# Patient Record
Sex: FEMALE | Race: BLACK OR AFRICAN AMERICAN | NOT HISPANIC OR LATINO | Employment: FULL TIME | URBAN - METROPOLITAN AREA
[De-identification: names, ages, dates, MRNs, and addresses within clinical notes are randomized per-mention and may not be internally consistent; named-entity substitution may affect disease eponyms.]

---

## 2021-03-27 ENCOUNTER — HOSPITAL ENCOUNTER (EMERGENCY)
Facility: CLINIC | Age: 21
Discharge: HOME | End: 2021-03-28
Attending: EMERGENCY MEDICINE | Admitting: EMERGENCY MEDICINE
Payer: COMMERCIAL

## 2021-03-27 DIAGNOSIS — R00.2 PALPITATIONS: ICD-10-CM

## 2021-03-27 DIAGNOSIS — Z86.59 HISTORY OF ANXIETY: ICD-10-CM

## 2021-03-27 LAB
ANION GAP SERPL CALCULATED.3IONS-SCNC: 4 MMOL/L (ref 3–14)
BASOPHILS # BLD AUTO: 0 10E9/L (ref 0–0.2)
BASOPHILS NFR BLD AUTO: 0.4 %
BUN SERPL-MCNC: 10 MG/DL (ref 7–30)
CALCIUM SERPL-MCNC: 8.9 MG/DL (ref 8.5–10.1)
CHLORIDE SERPL-SCNC: 106 MMOL/L (ref 94–109)
CO2 SERPL-SCNC: 26 MMOL/L (ref 20–32)
CREAT SERPL-MCNC: 0.73 MG/DL (ref 0.52–1.04)
DIFFERENTIAL METHOD BLD: NORMAL
EOSINOPHIL # BLD AUTO: 0.1 10E9/L (ref 0–0.7)
EOSINOPHIL NFR BLD AUTO: 1 %
ERYTHROCYTE [DISTWIDTH] IN BLOOD BY AUTOMATED COUNT: 11.8 % (ref 10–15)
GFR SERPL CREATININE-BSD FRML MDRD: >90 ML/MIN/{1.73_M2}
GLUCOSE SERPL-MCNC: 143 MG/DL (ref 70–99)
HCT VFR BLD AUTO: 38.9 % (ref 35–47)
HGB BLD-MCNC: 13.1 G/DL (ref 11.7–15.7)
IMM GRANULOCYTES # BLD: 0 10E9/L (ref 0–0.4)
IMM GRANULOCYTES NFR BLD: 0.3 %
LYMPHOCYTES # BLD AUTO: 2.1 10E9/L (ref 0.8–5.3)
LYMPHOCYTES NFR BLD AUTO: 19.5 %
MCH RBC QN AUTO: 29.2 PG (ref 26.5–33)
MCHC RBC AUTO-ENTMCNC: 33.7 G/DL (ref 31.5–36.5)
MCV RBC AUTO: 87 FL (ref 78–100)
MONOCYTES # BLD AUTO: 0.6 10E9/L (ref 0–1.3)
MONOCYTES NFR BLD AUTO: 5.7 %
NEUTROPHILS # BLD AUTO: 7.8 10E9/L (ref 1.6–8.3)
NEUTROPHILS NFR BLD AUTO: 73.1 %
NRBC # BLD AUTO: 0 10*3/UL
NRBC BLD AUTO-RTO: 0 /100
PLATELET # BLD AUTO: 383 10E9/L (ref 150–450)
POTASSIUM SERPL-SCNC: 3.3 MMOL/L (ref 3.4–5.3)
RBC # BLD AUTO: 4.49 10E12/L (ref 3.8–5.2)
SODIUM SERPL-SCNC: 136 MMOL/L (ref 133–144)
WBC # BLD AUTO: 10.7 10E9/L (ref 4–11)

## 2021-03-27 PROCEDURE — 85025 COMPLETE CBC W/AUTO DIFF WBC: CPT | Performed by: EMERGENCY MEDICINE

## 2021-03-27 PROCEDURE — 93005 ELECTROCARDIOGRAM TRACING: CPT

## 2021-03-27 PROCEDURE — 85379 FIBRIN DEGRADATION QUANT: CPT | Performed by: EMERGENCY MEDICINE

## 2021-03-27 PROCEDURE — 99284 EMERGENCY DEPT VISIT MOD MDM: CPT | Mod: 25

## 2021-03-27 PROCEDURE — 96360 HYDRATION IV INFUSION INIT: CPT

## 2021-03-27 PROCEDURE — 96361 HYDRATE IV INFUSION ADD-ON: CPT

## 2021-03-27 PROCEDURE — 80048 BASIC METABOLIC PNL TOTAL CA: CPT | Performed by: EMERGENCY MEDICINE

## 2021-03-27 PROCEDURE — 258N000003 HC RX IP 258 OP 636: Performed by: EMERGENCY MEDICINE

## 2021-03-27 PROCEDURE — 84703 CHORIONIC GONADOTROPIN ASSAY: CPT | Performed by: EMERGENCY MEDICINE

## 2021-03-27 RX ORDER — SODIUM CHLORIDE 9 MG/ML
INJECTION, SOLUTION INTRAVENOUS CONTINUOUS
Status: DISCONTINUED | OUTPATIENT
Start: 2021-03-28 | End: 2021-03-28 | Stop reason: HOSPADM

## 2021-03-27 RX ADMIN — SODIUM CHLORIDE 1000 ML: 9 INJECTION, SOLUTION INTRAVENOUS at 23:38

## 2021-03-27 ASSESSMENT — ENCOUNTER SYMPTOMS
COUGH: 0
CHEST TIGHTNESS: 1
LIGHT-HEADEDNESS: 1
PALPITATIONS: 1
CHILLS: 0
NAUSEA: 0
VOMITING: 0
FEVER: 0

## 2021-03-27 ASSESSMENT — MIFFLIN-ST. JEOR: SCORE: 1219.94

## 2021-03-28 VITALS
BODY MASS INDEX: 18.78 KG/M2 | HEIGHT: 63 IN | SYSTOLIC BLOOD PRESSURE: 117 MMHG | RESPIRATION RATE: 18 BRPM | HEART RATE: 107 BPM | WEIGHT: 106 LBS | TEMPERATURE: 98.5 F | DIASTOLIC BLOOD PRESSURE: 68 MMHG | OXYGEN SATURATION: 98 %

## 2021-03-28 LAB
D DIMER PPP FEU-MCNC: <0.3 UG/ML FEU (ref 0–0.5)
HCG SERPL QL: NEGATIVE
INTERPRETATION ECG - MUSE: NORMAL

## 2021-03-28 PROCEDURE — 258N000003 HC RX IP 258 OP 636: Performed by: EMERGENCY MEDICINE

## 2021-03-28 RX ADMIN — SODIUM CHLORIDE: 9 INJECTION, SOLUTION INTRAVENOUS at 00:57

## 2021-03-28 NOTE — ED PROVIDER NOTES
History   Chief Complaint:  Palpitations      HPI   Drea Schneider is a 20 year old female with history of anxiety who presents with palpitations.  The patient reports occasional episodes of her heart racing followed by lightheadedness, shakiness, and chest pain.  These episodes last 5 - 10 episodes and occur about twice a week.  She has not been able to identify any precipitating factors and feels these are not tied to more stressful situations.  She wears an Apple watch and her heart rate jumps up during these episodes to about the 150s and then gradually slows to normal range.  She is using Buspar prn for anxiety and took it today during an episode which seemed to help but the symptoms then worsened about 30 minutes later.  She was on Paxil but stopped this about a month ago as she did not like how it made her feel.  Currently she feels better other than some persistent shakiness.    Review of Systems   Constitutional: Negative for chills and fever.   Respiratory: Positive for chest tightness. Negative for cough.    Cardiovascular: Positive for chest pain and palpitations.   Gastrointestinal: Negative for nausea and vomiting.   Neurological: Positive for light-headedness.   All other systems reviewed and are negative.    Allergies:  No known drug allergies.     Medications:  Loratadine   Buspirone   Paxil  Renata  Asmanex inhaler  Albuterol inhaler  Flovent inhaler  Flonase nasal spray  Polyethylene glycol     Past Medical History:    Intermittent asthma  Allergic rhinitis       Social History:  Presents to the ED with a friend  PCP: Christine Monson NP     Physical Exam     Patient Vitals for the past 24 hrs:   BP Temp Temp src Pulse Resp SpO2 Height Weight   03/28/21 0100 -- -- -- 107 18 98 % -- --   03/28/21 0045 -- -- -- 111 13 99 % -- --   03/28/21 0030 -- -- -- 90 10 96 % -- --   03/28/21 0015 -- -- -- 98 17 98 % -- --   03/28/21 0000 117/68 -- -- 106 -- 97 % -- --   03/27/21 2330 -- -- -- 96 11 96 % -- --  "  03/27/21 2315 134/83 -- -- 110 21 100 % -- --   03/27/21 2255 -- -- -- -- -- -- 1.6 m (5' 3\") 48.1 kg (106 lb)   03/27/21 2254 (!) 150/84 98.5  F (36.9  C) Oral 127 20 100 % -- 48.1 kg (106 lb)       Physical Exam  Eye:  Pupils are equal, round, and reactive.  Extraocular movements intact.    ENT:  No rhinorrhea.  Moist mucus membranes.  Normal tongue and tonsil.    Cardiac:  Mildly tachycardic but regular.  No murmurs, gallops, or rubs.    Pulmonary:  Clear to auscultation bilaterally.  No wheezes, rales, or rhonchi.    Abdomen:  Positive bowel sounds.  Abdomen is soft and non-distended, without focal tenderness.    Musculoskeletal:  Normal movement of all extremities without evidence for deficit.    Skin:  Warm and dry without rashes.    Neurologic:  Non-focal exam without asymmetric weakness or numbness.     Psychiatric:  Normal affect with appropriate interaction with examiner.     Emergency Department Course   ECG:  ECG taken at 2310, ECG read at 2316  Sinus tachycardia. Septal infarct, age undetermined. T-wave abnormality, consider inferior ischemia. Abnormal ECG.   Rate 105 bpm. OH interval 202 ms. QRS duration 82 ms. QT/QTc 310/409 ms. P-R-T axes 67 84 17.     Laboratory:   CBC: WBC 10.7, HGB 13.1,   BMP: K 3.3 (L), Glucose 143 (H), otherwise WNL (Creatinine 0.73)    D-dimer: <0.3   HCG Qualitative, Blood: negative     Emergency Department Course:  Reviewed:  I reviewed nursing notes, vitals, past medical history and Care Everywhere    Assessments:  (2325) I obtained history and examined the patient as noted above.   (0100) I rechecked the patient and explained findings.     Interventions:  (2338) Normal Saline, 1 liter, IV bolus   (0057) Normal Saline, 125 mL/hr, IV infusion      Disposition:  The patient was discharged to home.     Impression & Plan     Medical Decision Making:  This delightful 20-year-old with a history of anxiety presents to us with spells of palpitations where she feels that " "her heart is suddenly racing and she feels short of breath.  She states that these spells come \"out of the blue\" and that she has been under less stress than usual recently.  She is concerned that these represent something more than panic attacks.    At time of my assessment, she was mildly tachycardic.  Head to toe exam is otherwise reassuring.  EKG shows a sinus tachycardia without other significant abnormality.  Laboratory investigation is normal.  D-dimer is negative.  Hemoglobin is normal.  She is not pregnant.    At this point, we discussed the possibility that this could represent spells of SVT.  We will set her up with a 48-hour Holter monitor.  Otherwise, she was advised to continue to work with her doctor on her anxiety and panic disorder as this seems more likely.  I have no concerns for acute coronary syndrome, pulmonary embolism, or other more serious intrathoracic pathologies.  She will otherwise return to us if she has prolonged spells of this or if there are any other emergent concerns.    Diagnosis:    ICD-10-CM    1. Palpitations  R00.2 Holter Monitor 48 hour Adult Pediatric   2. History of anxiety  Z86.59        Scribe Disclosure:  I, Roslyn Fish, am serving as a scribe at 11:17 PM on 3/27/2021 to document services personally performed by Trierweiler, Chad A, MD based on my observations and the provider's statements to me.         Trierweiler, Chad A, MD  03/28/21 0650    "

## 2021-03-28 NOTE — ED TRIAGE NOTES
Pt has had episodes of palpitations with shakiness on and off for the past couple of weeks. She states she thought it was anxiety, and took Buspirone about half hour ago. Pt states she feels tightness in her chest.

## 2021-04-05 ENCOUNTER — HOSPITAL ENCOUNTER (OUTPATIENT)
Dept: CARDIOLOGY | Facility: CLINIC | Age: 21
Discharge: HOME | End: 2021-04-05
Attending: EMERGENCY MEDICINE | Admitting: EMERGENCY MEDICINE
Payer: COMMERCIAL

## 2021-04-05 DIAGNOSIS — R00.2 PALPITATIONS: ICD-10-CM

## 2021-04-05 PROCEDURE — 93227 XTRNL ECG REC<48 HR R&I: CPT | Performed by: INTERNAL MEDICINE

## 2021-04-05 PROCEDURE — 93225 XTRNL ECG REC<48 HRS REC: CPT

## 2021-05-11 ENCOUNTER — TELEPHONE (OUTPATIENT)
Dept: CARDIOLOGY | Facility: CLINIC | Age: 21
End: 2021-05-11

## 2021-05-11 NOTE — TELEPHONE ENCOUNTER
I received a call from the [facility] asking for help obtaining Holter Monitor report for their visit today. Report was printed and faxed to Christine Monson (provider) at [phone].

## 2021-11-20 ENCOUNTER — HEALTH MAINTENANCE LETTER (OUTPATIENT)
Age: 21
End: 2021-11-20

## 2022-04-21 ENCOUNTER — OFFICE VISIT (OUTPATIENT)
Dept: FAMILY MEDICINE | Facility: CLINIC | Age: 22
End: 2022-04-21

## 2022-04-21 VITALS
SYSTOLIC BLOOD PRESSURE: 120 MMHG | TEMPERATURE: 99.5 F | DIASTOLIC BLOOD PRESSURE: 70 MMHG | OXYGEN SATURATION: 97 % | HEART RATE: 88 BPM

## 2022-04-21 DIAGNOSIS — J32.9 SINUSITIS, UNSPECIFIED CHRONICITY, UNSPECIFIED LOCATION: Primary | ICD-10-CM

## 2022-04-21 PROCEDURE — 99203 OFFICE O/P NEW LOW 30 MIN: CPT | Performed by: FAMILY MEDICINE

## 2022-04-21 RX ORDER — AZITHROMYCIN 250 MG/1
TABLET, FILM COATED ORAL
Qty: 6 TABLET | Refills: 0 | Status: SHIPPED | OUTPATIENT
Start: 2022-04-21

## 2022-04-21 ASSESSMENT — ASTHMA QUESTIONNAIRES
ACT_TOTALSCORE: 24
QUESTION_2 LAST FOUR WEEKS HOW OFTEN HAVE YOU HAD SHORTNESS OF BREATH: NOT AT ALL
QUESTION_1 LAST FOUR WEEKS HOW MUCH OF THE TIME DID YOUR ASTHMA KEEP YOU FROM GETTING AS MUCH DONE AT WORK, SCHOOL OR AT HOME: NONE OF THE TIME
ACT_TOTALSCORE: 24
QUESTION_3 LAST FOUR WEEKS HOW OFTEN DID YOUR ASTHMA SYMPTOMS (WHEEZING, COUGHING, SHORTNESS OF BREATH, CHEST TIGHTNESS OR PAIN) WAKE YOU UP AT NIGHT OR EARLIER THAN USUAL IN THE MORNING: NOT AT ALL
QUESTION_4 LAST FOUR WEEKS HOW OFTEN HAVE YOU USED YOUR RESCUE INHALER OR NEBULIZER MEDICATION (SUCH AS ALBUTEROL): NOT AT ALL
QUESTION_5 LAST FOUR WEEKS HOW WOULD YOU RATE YOUR ASTHMA CONTROL: WELL CONTROLLED

## 2022-04-21 NOTE — PROGRESS NOTES
Assessment & Plan     Sinusitis, unspecified chronicity, unspecified location  Patient continued to have some upper respiratory symptoms sinus congestion.  Suggested azithromycin.  If symptoms does not improve or any fever I would suggest repeating COVID test.  Symptomatic care discussed with the patient  - azithromycin (ZITHROMAX) 250 MG tablet; Two tablets first day, then one tablet daily for four days.          Return in about 2 weeks (around 5/5/2022) for if symptom does not get better.    Mateo Sherwood MD  [facility] CRYSTAL Shepard is a 21 year old who presents for the following health issues     History of Present Illness       Reason for visit:  Sore throat and congestion - neg at home covid test Monday   Symptom onset:  3-7 days ago    She eats 2-3 servings of fruits and vegetables daily.She consumes 0 sweetened beverage(s) daily.She exercises with enough effort to increase her heart rate 10 to 19 minutes per day.  She exercises with enough effort to increase her heart rate 3 or less days per week.   She is taking medications regularly.     Now complains more of sinus congestion throat is improving.  Home COVID test is negative she is vaccinated.  She initially had fever which has improved.    Review of Systems   Constitutional, HEENT, cardiovascular, pulmonary, gi and gu systems are negative, except as otherwise noted.      Objective    /70   Pulse 88   Temp 99.5  F (37.5  C) (Tympanic)   LMP 04/19/2022   SpO2 97%   There is no height or weight on file to calculate BMI.  Physical Exam   GENERAL: healthy, alert and no distress  NECK: no adenopathy, no asymmetry, masses, or scars and thyroid normal to palpation  RESP: lungs clear to auscultation - no rales, rhonchi or wheezes  Sinus tenderness and nasal congestin noted.  CV: regular rate and rhythm, normal S1 S2, no S3 or S4, no murmur, click or rub, no peripheral edema and peripheral pulses strong

## 2022-09-24 ENCOUNTER — APPOINTMENT (OUTPATIENT)
Dept: ULTRASOUND IMAGING | Facility: CLINIC | Age: 22
End: 2022-09-24
Attending: EMERGENCY MEDICINE

## 2022-09-24 ENCOUNTER — HOSPITAL ENCOUNTER (EMERGENCY)
Facility: CLINIC | Age: 22
Discharge: HOME | End: 2022-09-24
Attending: EMERGENCY MEDICINE | Admitting: EMERGENCY MEDICINE

## 2022-09-24 VITALS
HEIGHT: 64 IN | BODY MASS INDEX: 19.63 KG/M2 | OXYGEN SATURATION: 96 % | RESPIRATION RATE: 20 BRPM | HEART RATE: 84 BPM | DIASTOLIC BLOOD PRESSURE: 82 MMHG | TEMPERATURE: 97.8 F | WEIGHT: 115 LBS | SYSTOLIC BLOOD PRESSURE: 124 MMHG

## 2022-09-24 DIAGNOSIS — R10.13 ABDOMINAL PAIN, EPIGASTRIC: ICD-10-CM

## 2022-09-24 DIAGNOSIS — K29.00 ACUTE GASTRITIS, PRESENCE OF BLEEDING UNSPECIFIED, UNSPECIFIED GASTRITIS TYPE: ICD-10-CM

## 2022-09-24 DIAGNOSIS — R11.2 NON-INTRACTABLE VOMITING WITH NAUSEA, UNSPECIFIED VOMITING TYPE: ICD-10-CM

## 2022-09-24 LAB
ALBUMIN SERPL-MCNC: 4.3 G/DL (ref 3.4–5)
ALP SERPL-CCNC: 54 U/L (ref 40–150)
ALT SERPL W P-5'-P-CCNC: 26 U/L (ref 0–50)
ANION GAP SERPL CALCULATED.3IONS-SCNC: 9 MMOL/L (ref 3–14)
AST SERPL W P-5'-P-CCNC: 22 U/L (ref 0–45)
BASOPHILS # BLD AUTO: 0.1 10E3/UL (ref 0–0.2)
BASOPHILS NFR BLD AUTO: 0 %
BILIRUB SERPL-MCNC: 0.5 MG/DL (ref 0.2–1.3)
BUN SERPL-MCNC: 13 MG/DL (ref 7–30)
CALCIUM SERPL-MCNC: 8.9 MG/DL (ref 8.5–10.1)
CHLORIDE BLD-SCNC: 109 MMOL/L (ref 94–109)
CO2 SERPL-SCNC: 24 MMOL/L (ref 20–32)
CREAT SERPL-MCNC: 0.61 MG/DL (ref 0.52–1.04)
EOSINOPHIL # BLD AUTO: 0.1 10E3/UL (ref 0–0.7)
EOSINOPHIL NFR BLD AUTO: 1 %
ERYTHROCYTE [DISTWIDTH] IN BLOOD BY AUTOMATED COUNT: 12.1 % (ref 10–15)
GFR SERPL CREATININE-BSD FRML MDRD: >90 ML/MIN/1.73M2
GLUCOSE BLD-MCNC: 103 MG/DL (ref 70–99)
HCT VFR BLD AUTO: 39.5 % (ref 35–47)
HGB BLD-MCNC: 13.7 G/DL (ref 11.7–15.7)
IMM GRANULOCYTES # BLD: 0.1 10E3/UL
IMM GRANULOCYTES NFR BLD: 1 %
LIPASE SERPL-CCNC: 77 U/L (ref 73–393)
LYMPHOCYTES # BLD AUTO: 1.4 10E3/UL (ref 0.8–5.3)
LYMPHOCYTES NFR BLD AUTO: 9 %
MCH RBC QN AUTO: 30.2 PG (ref 26.5–33)
MCHC RBC AUTO-ENTMCNC: 34.7 G/DL (ref 31.5–36.5)
MCV RBC AUTO: 87 FL (ref 78–100)
MONOCYTES # BLD AUTO: 0.7 10E3/UL (ref 0–1.3)
MONOCYTES NFR BLD AUTO: 5 %
NEUTROPHILS # BLD AUTO: 13.5 10E3/UL (ref 1.6–8.3)
NEUTROPHILS NFR BLD AUTO: 84 %
NRBC # BLD AUTO: 0 10E3/UL
NRBC BLD AUTO-RTO: 0 /100
PLATELET # BLD AUTO: 354 10E3/UL (ref 150–450)
POTASSIUM BLD-SCNC: 3.6 MMOL/L (ref 3.4–5.3)
PROT SERPL-MCNC: 8 G/DL (ref 6.8–8.8)
RBC # BLD AUTO: 4.54 10E6/UL (ref 3.8–5.2)
SODIUM SERPL-SCNC: 142 MMOL/L (ref 133–144)
WBC # BLD AUTO: 15.9 10E3/UL (ref 4–11)

## 2022-09-24 PROCEDURE — 99285 EMERGENCY DEPT VISIT HI MDM: CPT | Mod: 25

## 2022-09-24 PROCEDURE — 96361 HYDRATE IV INFUSION ADD-ON: CPT

## 2022-09-24 PROCEDURE — 80053 COMPREHEN METABOLIC PANEL: CPT | Performed by: EMERGENCY MEDICINE

## 2022-09-24 PROCEDURE — 258N000003 HC RX IP 258 OP 636: Performed by: EMERGENCY MEDICINE

## 2022-09-24 PROCEDURE — 96375 TX/PRO/DX INJ NEW DRUG ADDON: CPT

## 2022-09-24 PROCEDURE — 36415 COLL VENOUS BLD VENIPUNCTURE: CPT | Performed by: EMERGENCY MEDICINE

## 2022-09-24 PROCEDURE — 96374 THER/PROPH/DIAG INJ IV PUSH: CPT

## 2022-09-24 PROCEDURE — 85014 HEMATOCRIT: CPT | Performed by: EMERGENCY MEDICINE

## 2022-09-24 PROCEDURE — 250N000011 HC RX IP 250 OP 636: Performed by: EMERGENCY MEDICINE

## 2022-09-24 PROCEDURE — 76705 ECHO EXAM OF ABDOMEN: CPT

## 2022-09-24 PROCEDURE — 250N000013 HC RX MED GY IP 250 OP 250 PS 637: Performed by: EMERGENCY MEDICINE

## 2022-09-24 PROCEDURE — 83690 ASSAY OF LIPASE: CPT | Performed by: EMERGENCY MEDICINE

## 2022-09-24 PROCEDURE — 82040 ASSAY OF SERUM ALBUMIN: CPT | Performed by: EMERGENCY MEDICINE

## 2022-09-24 RX ORDER — HYDROCODONE BITARTRATE AND ACETAMINOPHEN 5; 325 MG/1; MG/1
2 TABLET ORAL ONCE
Status: COMPLETED | OUTPATIENT
Start: 2022-09-24 | End: 2022-09-24

## 2022-09-24 RX ORDER — HYDROCODONE BITARTRATE AND ACETAMINOPHEN 5; 325 MG/1; MG/1
1 TABLET ORAL EVERY 6 HOURS PRN
Qty: 8 TABLET | Refills: 0 | Status: SHIPPED | OUTPATIENT
Start: 2022-09-24 | End: 2022-09-27

## 2022-09-24 RX ORDER — ONDANSETRON 4 MG/1
4 TABLET, ORALLY DISINTEGRATING ORAL EVERY 8 HOURS PRN
Qty: 10 TABLET | Refills: 0 | Status: SHIPPED | OUTPATIENT
Start: 2022-09-24

## 2022-09-24 RX ORDER — HYDROMORPHONE HYDROCHLORIDE 1 MG/ML
0.5 INJECTION, SOLUTION INTRAMUSCULAR; INTRAVENOUS; SUBCUTANEOUS
Status: DISCONTINUED | OUTPATIENT
Start: 2022-09-24 | End: 2022-09-24

## 2022-09-24 RX ORDER — KETOROLAC TROMETHAMINE 15 MG/ML
15 INJECTION, SOLUTION INTRAMUSCULAR; INTRAVENOUS ONCE
Status: COMPLETED | OUTPATIENT
Start: 2022-09-24 | End: 2022-09-24

## 2022-09-24 RX ORDER — ONDANSETRON 2 MG/ML
4 INJECTION INTRAMUSCULAR; INTRAVENOUS ONCE
Status: COMPLETED | OUTPATIENT
Start: 2022-09-24 | End: 2022-09-24

## 2022-09-24 RX ORDER — SUCRALFATE 1 G/1
1 TABLET ORAL 4 TIMES DAILY PRN
Qty: 60 TABLET | Refills: 0 | Status: SHIPPED | OUTPATIENT
Start: 2022-09-24

## 2022-09-24 RX ADMIN — HYDROCODONE BITARTRATE AND ACETAMINOPHEN 2 TABLET: 5; 325 TABLET ORAL at 12:59

## 2022-09-24 RX ADMIN — KETOROLAC TROMETHAMINE 15 MG: 15 INJECTION, SOLUTION INTRAMUSCULAR; INTRAVENOUS at 10:48

## 2022-09-24 RX ADMIN — SODIUM CHLORIDE 1000 ML: 9 INJECTION, SOLUTION INTRAVENOUS at 10:46

## 2022-09-24 RX ADMIN — FAMOTIDINE 20 MG: 10 INJECTION, SOLUTION INTRAVENOUS at 12:03

## 2022-09-24 RX ADMIN — ONDANSETRON 4 MG: 2 INJECTION INTRAMUSCULAR; INTRAVENOUS at 10:47

## 2022-09-24 ASSESSMENT — ENCOUNTER SYMPTOMS
VOMITING: 1
ABDOMINAL PAIN: 1
FEVER: 0
NAUSEA: 1
COUGH: 0
SHORTNESS OF BREATH: 0

## 2022-09-24 ASSESSMENT — ACTIVITIES OF DAILY LIVING (ADL)
ADLS_ACUITY_SCORE: 35
ADLS_ACUITY_SCORE: 35

## 2022-09-24 NOTE — ED NOTES
Dilaudid offered to patient. Patient stated her pain level was 1/10 and declined need for Dilaudid at this time. Writer informed patient that a dose was still available if her pain level increased. Patient acknowledged understanding.

## 2022-09-24 NOTE — ED PROVIDER NOTES
"  History   Chief Complaint:  Abdominal Pain       The history is provided by the patient.      Drea Schneider is a 22 year old female who presents with abdominal pain. The patient states that she went out drinking last night, and started experiencing severe abdominal pain this morning. The pain is centralized and is accompanied with nausea and vomiting. The patient denies any pelvic pain, fever, cough, shortness of breath, chest pain, history of pancreatitis, or previous abdominal surgeries. Of note, her last menstruation was a few weeks ago.     Review of Systems   Constitutional: Negative for fever.   Respiratory: Negative for cough and shortness of breath.    Cardiovascular: Negative for chest pain.   Gastrointestinal: Positive for abdominal pain, nausea and vomiting.   Genitourinary: Negative for pelvic pain.   All other systems reviewed and are negative.    Allergies:  The patient has no known allergies.     Medications:  Buspar  Albuterol    Past Medical History:     SERGIO  Panic attacks  Asthma  Heart murmur     Family History:    Heart disease - father  Anxiety - mother    Social History:  Patient came from home.  Patient is accompanied in the ED by her partner.    Physical Exam     Patient Vitals for the past 24 hrs:   BP Temp Temp src Pulse Resp SpO2 Height Weight   09/24/22 1345 -- -- -- 84 -- 96 % -- --   09/24/22 1230 -- -- -- 79 -- 99 % -- --   09/24/22 1130 -- -- -- 89 -- 97 % -- --   09/24/22 1115 -- -- -- -- -- 98 % -- --   09/24/22 1050 -- -- -- -- 20 -- -- --   09/24/22 1025 124/82 97.8  F (36.6  C) Temporal 104 -- 99 % 1.626 m (5' 4\") 52.2 kg (115 lb)     Physical Exam  General: Alert, appears well-developed and well-nourished. Cooperative.     In mild distress  HEENT:  Head:  Atraumatic  Ears:  External ears are normal  Mouth/Throat:  Oropharynx is without erythema or exudate and mucous membranes are moist.   Eyes:   Conjunctivae normal and EOM are normal. No scleral icterus.  CV:  Tachycardic " rate, regular rhythm, normal heart sounds and radial pulses are 2+ and symmetric.  No murmur.  Resp:  Breath sounds are clear bilaterally    Non-labored, no retractions or accessory muscle use  GI:  Abdomen is soft, no distension, epigastric tenderness. No rebound or guarding.  No CVA tenderness bilaterally  MS:  Normal range of motion. No edema.    Normal strength in all 4 extremities.     Back atraumatic.    No midline cervical, thoracic, or lumbar tenderness  Skin:  Warm and dry.  No rash or lesions noted.  Neuro: Alert. Normal strength.  GCS: 15  Psych:  Normal mood and affect.    Emergency Department Course   ECG  ECG results from 03/27/21   EKG 12 lead     Value    Interpretation ECG Click View Image link to view waveform and result     Imaging:  US Abdomen Limited (RUQ)   Final Result   IMPRESSION:   Unremarkable right upper quadrant ultrasound. No cause for epigastric pain is identified.              Report per radiology    Laboratory:  Labs Ordered and Resulted from Time of ED Arrival to Time of ED Departure   COMPREHENSIVE METABOLIC PANEL - Abnormal       Result Value    Sodium 142      Potassium 3.6      Chloride 109      Carbon Dioxide (CO2) 24      Anion Gap 9      Urea Nitrogen 13      Creatinine 0.61      Calcium 8.9      Glucose 103 (*)     Alkaline Phosphatase 54      AST 22      ALT 26      Protein Total 8.0      Albumin 4.3      Bilirubin Total 0.5      GFR Estimate >90     CBC WITH PLATELETS AND DIFFERENTIAL - Abnormal    WBC Count 15.9 (*)     RBC Count 4.54      Hemoglobin 13.7      Hematocrit 39.5      MCV 87      MCH 30.2      MCHC 34.7      RDW 12.1      Platelet Count 354      % Neutrophils 84      % Lymphocytes 9      % Monocytes 5      % Eosinophils 1      % Basophils 0      % Immature Granulocytes 1      NRBCs per 100 WBC 0      Absolute Neutrophils 13.5 (*)     Absolute Lymphocytes 1.4      Absolute Monocytes 0.7      Absolute Eosinophils 0.1      Absolute Basophils 0.1      Absolute  Immature Granulocytes 0.1      Absolute NRBCs 0.0     LIPASE - Normal    Lipase 77          Emergency Department Course:       Reviewed:  I reviewed nursing notes, vitals, past medical history and Care Everywhere    Assessments:  1024 I obtained history and examined the patient as noted above.   1229 I rechecked the patient and explained findings.   1344 I rechecked and updated the patient.    Interventions:  1046 NS 1 L IV  1047 Zofran 4 mg IV  1048 Toradol 15 mg IV  1203 Pepcid 20 mg IV  1259 Norco two 5-325 tablets PO    Disposition:  The patient was discharged to home.     Impression & Plan     Medical Decision Making:  Drea Schneider is a 22 year old female who presents for evaluation of epigastric abdominal pain.  I considered a broad differential diagnosis for this patient including gastritis, GERD, cholecystitis, choledocholithiasis, biliary colic, pancreatitis, colonic or small bowel pathology, vascular etiologies including aneurysm, ulcer.  Signs and symptoms here are consistent with gastritis vs gastric ulcer.  Patient experienced relief here with interventions.  US showed no biliary pathology.  Labs reassuring, no pancreatitis.  There are no signs of any serious etiologies as mentioned above or intraabdominal catastrophes.  I highly doubt this is a PE or acute coronary syndrome and as she is tender in the abdomen would not do any further workup for this.  Doubt perforated ulcer at this point.  At this point symptoms seem well managed with interventions.  I would not perform advanced CT imaging at this point as lower concern for sinister intra-abdominal pathology such as perforated ulcer.  We will attempt Prilosec for the next 30 days and plan for other symptomatic management with Zofran, Carafate, and a limited prescription for Norco should pain symptoms be severe.  Follow-up with primary care in the next 1 to 2 weeks.  Consider endoscopy if further symptoms despite this.  Gastritis precautions given for  home.      Diagnosis:    ICD-10-CM    1. Abdominal pain, epigastric  R10.13    2. Non-intractable vomiting with nausea, unspecified vomiting type  R11.2    3. Acute gastritis, presence of bleeding unspecified, unspecified gastritis type  K29.00        Discharge Medications:  Discharge Medication List as of 9/24/2022  1:48 PM      START taking these medications    Details   HYDROcodone-acetaminophen (NORCO) 5-325 MG tablet Take 1 tablet by mouth every 6 hours as needed for severe pain (7-10), Disp-8 tablet, R-0, Local Print      omeprazole (PRILOSEC) 20 MG DR capsule Take 1 capsule (20 mg) by mouth daily for 30 days, Disp-30 capsule, R-0, Local Print      ondansetron (ZOFRAN ODT) 4 MG ODT tab Take 1 tablet (4 mg) by mouth every 8 hours as needed for nausea, Disp-10 tablet, R-0, Local Print      sucralfate (CARAFATE) 1 GM tablet Take 1 tablet (1 g) by mouth 4 times daily as needed for nausea, Disp-60 tablet, R-0, Local Print             Scribe Disclosure:  I, Naomi Caraballo, am serving as a scribe at 10:24 AM on 9/24/2022 to document services personally performed by Jesu Vanessa MD based on my observations and the provider's statements to me.        Jesu Vanessa MD  09/24/22 1636

## 2022-09-24 NOTE — DISCHARGE INSTRUCTIONS

## 2023-04-22 ENCOUNTER — HEALTH MAINTENANCE LETTER (OUTPATIENT)
Age: 23
End: 2023-04-22

## 2024-02-06 ENCOUNTER — HOSPITAL ENCOUNTER (EMERGENCY)
Facility: HOSPITAL | Age: 24
Discharge: HOME | End: 2024-02-06
Admitting: PHYSICIAN ASSISTANT
Payer: COMMERCIAL

## 2024-02-06 VITALS
HEIGHT: 64 IN | RESPIRATION RATE: 16 BRPM | DIASTOLIC BLOOD PRESSURE: 76 MMHG | SYSTOLIC BLOOD PRESSURE: 122 MMHG | OXYGEN SATURATION: 100 % | BODY MASS INDEX: 20.66 KG/M2 | HEART RATE: 80 BPM | TEMPERATURE: 97.5 F | WEIGHT: 121 LBS

## 2024-02-06 DIAGNOSIS — N30.01 ACUTE CYSTITIS WITH HEMATURIA: ICD-10-CM

## 2024-02-06 PROBLEM — F41.1 GAD (GENERALIZED ANXIETY DISORDER): Status: ACTIVE | Noted: 2017-05-25

## 2024-02-06 PROBLEM — F41.0 PANIC ATTACKS: Status: ACTIVE | Noted: 2017-05-25

## 2024-02-06 LAB
ALBUMIN UR-MCNC: 30 MG/DL
ANION GAP SERPL CALCULATED.3IONS-SCNC: 16 MMOL/L (ref 7–15)
APPEARANCE UR: ABNORMAL
BACTERIA #/AREA URNS HPF: ABNORMAL /HPF
BASOPHILS # BLD AUTO: 0.1 10E3/UL (ref 0–0.2)
BASOPHILS NFR BLD AUTO: 0 %
BILIRUB UR QL STRIP: NEGATIVE
BUN SERPL-MCNC: 9.5 MG/DL (ref 6–20)
CALCIUM SERPL-MCNC: 9 MG/DL (ref 8.6–10)
CHLORIDE SERPL-SCNC: 100 MMOL/L (ref 98–107)
COLOR UR AUTO: COLORLESS
CREAT SERPL-MCNC: 0.7 MG/DL (ref 0.51–0.95)
DEPRECATED HCO3 PLAS-SCNC: 22 MMOL/L (ref 22–29)
EGFRCR SERPLBLD CKD-EPI 2021: >90 ML/MIN/1.73M2
EOSINOPHIL # BLD AUTO: 0.2 10E3/UL (ref 0–0.7)
EOSINOPHIL NFR BLD AUTO: 1 %
ERYTHROCYTE [DISTWIDTH] IN BLOOD BY AUTOMATED COUNT: 11.9 % (ref 10–15)
GLUCOSE SERPL-MCNC: 109 MG/DL (ref 70–99)
GLUCOSE UR STRIP-MCNC: NEGATIVE MG/DL
HCG UR QL: NEGATIVE
HCT VFR BLD AUTO: 39.6 % (ref 35–47)
HGB BLD-MCNC: 13.8 G/DL (ref 11.7–15.7)
HGB UR QL STRIP: ABNORMAL
IMM GRANULOCYTES # BLD: 0.1 10E3/UL
IMM GRANULOCYTES NFR BLD: 1 %
KETONES UR STRIP-MCNC: NEGATIVE MG/DL
LEUKOCYTE ESTERASE UR QL STRIP: ABNORMAL
LYMPHOCYTES # BLD AUTO: 1.9 10E3/UL (ref 0.8–5.3)
LYMPHOCYTES NFR BLD AUTO: 13 %
MCH RBC QN AUTO: 30.8 PG (ref 26.5–33)
MCHC RBC AUTO-ENTMCNC: 34.8 G/DL (ref 31.5–36.5)
MCV RBC AUTO: 88 FL (ref 78–100)
MONOCYTES # BLD AUTO: 0.8 10E3/UL (ref 0–1.3)
MONOCYTES NFR BLD AUTO: 5 %
NEUTROPHILS # BLD AUTO: 11.8 10E3/UL (ref 1.6–8.3)
NEUTROPHILS NFR BLD AUTO: 80 %
NITRATE UR QL: NEGATIVE
NRBC # BLD AUTO: 0 10E3/UL
NRBC BLD AUTO-RTO: 0 /100
PH UR STRIP: 6 [PH] (ref 5–7)
PLATELET # BLD AUTO: 368 10E3/UL (ref 150–450)
POTASSIUM SERPL-SCNC: 3.6 MMOL/L (ref 3.4–5.3)
RBC # BLD AUTO: 4.48 10E6/UL (ref 3.8–5.2)
RBC URINE: <1 /HPF
SODIUM SERPL-SCNC: 138 MMOL/L (ref 135–145)
SP GR UR STRIP: 1 (ref 1–1.03)
UROBILINOGEN UR STRIP-MCNC: <2 MG/DL
WBC # BLD AUTO: 14.8 10E3/UL (ref 4–11)
WBC URINE: 57 /HPF

## 2024-02-06 PROCEDURE — 250N000011 HC RX IP 250 OP 636: Performed by: PHYSICIAN ASSISTANT

## 2024-02-06 PROCEDURE — 96365 THER/PROPH/DIAG IV INF INIT: CPT

## 2024-02-06 PROCEDURE — 87086 URINE CULTURE/COLONY COUNT: CPT | Performed by: STUDENT IN AN ORGANIZED HEALTH CARE EDUCATION/TRAINING PROGRAM

## 2024-02-06 PROCEDURE — 81001 URINALYSIS AUTO W/SCOPE: CPT | Performed by: STUDENT IN AN ORGANIZED HEALTH CARE EDUCATION/TRAINING PROGRAM

## 2024-02-06 PROCEDURE — 87186 SC STD MICRODIL/AGAR DIL: CPT | Performed by: STUDENT IN AN ORGANIZED HEALTH CARE EDUCATION/TRAINING PROGRAM

## 2024-02-06 PROCEDURE — 80048 BASIC METABOLIC PNL TOTAL CA: CPT | Performed by: FAMILY MEDICINE

## 2024-02-06 PROCEDURE — 81025 URINE PREGNANCY TEST: CPT | Performed by: FAMILY MEDICINE

## 2024-02-06 PROCEDURE — 99284 EMERGENCY DEPT VISIT MOD MDM: CPT | Mod: 25

## 2024-02-06 PROCEDURE — 36415 COLL VENOUS BLD VENIPUNCTURE: CPT | Performed by: FAMILY MEDICINE

## 2024-02-06 PROCEDURE — 85025 COMPLETE CBC W/AUTO DIFF WBC: CPT | Performed by: FAMILY MEDICINE

## 2024-02-06 RX ORDER — CEPHALEXIN 500 MG/1
500 CAPSULE ORAL 4 TIMES DAILY
Qty: 20 CAPSULE | Refills: 0 | Status: SHIPPED | OUTPATIENT
Start: 2024-02-06

## 2024-02-06 RX ORDER — CEFTRIAXONE 1 G/1
1 INJECTION, POWDER, FOR SOLUTION INTRAMUSCULAR; INTRAVENOUS ONCE
Status: COMPLETED | OUTPATIENT
Start: 2024-02-06 | End: 2024-02-06

## 2024-02-06 RX ORDER — CEPHALEXIN 500 MG/1
500 CAPSULE ORAL 4 TIMES DAILY
Qty: 20 CAPSULE | Refills: 0 | Status: SHIPPED | OUTPATIENT
Start: 2024-02-06 | End: 2024-02-06

## 2024-02-06 RX ADMIN — CEFTRIAXONE SODIUM 1 G: 1 INJECTION, POWDER, FOR SOLUTION INTRAMUSCULAR; INTRAVENOUS at 21:29

## 2024-02-06 ASSESSMENT — ACTIVITIES OF DAILY LIVING (ADL): ADLS_ACUITY_SCORE: 33

## 2024-02-06 NOTE — Clinical Note
Drea Schneider was seen and treated in our emergency department on 2/6/2024.  She may return to work on 02/08/2024.       If you have any questions or concerns, please don't hesitate to call.      Ileana Amin PA-C

## 2024-02-07 LAB — BACTERIA UR CULT: ABNORMAL

## 2024-02-07 NOTE — DISCHARGE INSTRUCTIONS
As we discussed, you have a bladder infection or a urinary tract infection which we will treat with antibiotics.  We have given you a dose of IV antibiotics here in the emergency department and we will send you home on oral antibiotics.  We will call you if we need to change antibiotics based on your urine culture.  If it anytime you develop fever, chills, back or abdominal pain, nausea or vomiting, please return to the emergency department for further evaluation.  I would like you to have a recheck in your primary care clinic in 2 days to make sure symptoms are improving.    Your blood pressure was elevated in the emergencydepartment today and requires recheck and close follow-up in your primary care clinic. Untreated blood pressure can cause serious complications including, but not limited to stroke, heart attack/failure, and kidney disease.  Please make a close follow-up appointment to have this recheck performed. Please return to the emergency department immediately if you develop a severe headache, vision changes, chest pain, shortness of breath, orabdominal pain.

## 2024-02-07 NOTE — ED PROVIDER NOTES
Emergency Department Encounter   NAME: Drea Schneider ; AGE: 23 year old female ; YOB: 2000 ; MRN: [id number] ; PCP: [facility] [place]   ED PROVIDER: Ileana Amin PA-C    Evaluation Date & Time:   2/6/2024  8:10 PM    CHIEF COMPLAINT:  Dysuria      Impression and Plan   MDM:   Drea Schneider is a 23 year old female with a pertinent history of asthma, anxiety, SERGIO, and panic attacks who presents to the ED by private car for evaluation of hematuria. The patient presents to the emergency department for evaluation of 3 days of urinary dysuria, frequency, and urgency.  She noticed blood in her urine today, prompting her visit to the ER.  Here in the ED, she is generally well-appearing, very pleasant, and in no acute distress.  Afebrile vitally stable.  She denies any associated abdominal or back pain with this, and has no personal history of kidney stones.  No CVA tenderness and her abdominal exam is completely benign, she does not appear uncomfortable, and certainly no evidence of renal colic.  Low suspicion for an obstructing stone which we discussed at length.  Offered CT imaging of her abdomen, however shared medical decision making performed, and will not obtain tonight though she will closely monitor her symptoms and return to the ER with any pain or worsening infectious symptoms.  She is not having any fever, vomiting, or CVA tenderness to suggest pyelonephritis.  Pregnancy test is negative.  Renal function is normal.  WBC returned at 14.8 -though normal vitals and no concerns for sepsis.  UA with a large amount of blood, 500 leukocytes, and 57 WBCs and given her symptoms is consistent with hemorrhagic cystitis.  No calcium oxalate crystals.  She was given a dose of IV Rocephin here in the emergency department and will be discharged home with a course of Keflex.  We discussed plan for close recheck in clinic in 2 days and strict return precautions.  She verbalized understanding is  comfortable with the plan.  Discharged home in stable condition.    Medical Decision Making    History:  Supplemental history from: Documented in chart  External Record(s) reviewed: Inpatient Record: ED visit on 9/24/2022    Work Up:  Chart documentation includes differential considered and any EKGs or imaging independently interpreted by provider, where specified.  In additional to work up documented, I considered the following work up: Documented in chart, if applicable.    External consultation:  Discussion of management with another provider: Documented in chart, if applicable    Complicating factors:  Care impacted by chronic illness: mental health   Care affected by social determinants of health: Access to Medical Care    Disposition considerations: Discharge. I prescribed additional prescription strength medication(s) as charted. See documentation for any additional details.      ED COURSE:  9:00 PM I met and introduced myself to the patient. I gathered initial history and performed my physical exam. We discussed results, discharge, follow up, and reasons to return to the ED.     At the conclusion of the encounter I discussed the results of all the tests and the disposition. The questions were answered. The patient or family acknowledged understanding and was agreeable with the care plan.    FINAL IMPRESSION:    ICD-10-CM    1. Acute cystitis with hematuria  N30.01             MEDICATIONS GIVEN IN THE EMERGENCY DEPARTMENT:  Medications   cefTRIAXone (ROCEPHIN) 1 g vial to attach to  mL bag for ADULTS or NS 50 mL bag for PEDS (has no administration in time range)         NEW PRESCRIPTIONS STARTED AT TODAY'S ED VISIT:  New Prescriptions    CEPHALEXIN (KEFLEX) 500 MG CAPSULE    Take 1 capsule (500 mg) by mouth 4 times daily for 5 days         HPI   Patient information was obtained from: patient   Use of Intrepreter: N/A     Drea Schneider is a 23 year old female with a pertinent history of asthma, anxiety,  "SERGIO, and panic attacks who presents to the ED by private car for evaluation of hematuria.     The patient reports an increase of urinating frequency a few days ago. Discharge, dysuria, and groin pressure are associated with the increased urinating frequency. However, no chunks were present. She grew concerned when a couple of hours ago she had a hematuria. Of note, she has had history of a bladder infection. Not allergic to antibiotics.     The patient denies back pain, abdominal pain, nausea, vomiting, fever, chills, and any other complaints at this time.     REVIEW OF SYSTEMS:  Pertinent positive and negative symptoms per HPI.       Medical History     No past medical history on file.    No past surgical history on file.    No family history on file.    Social History     Tobacco Use    Smoking status: Former    Smokeless tobacco: Never    Tobacco comments:     quit 3 months ago   Substance Use Topics    Alcohol use: No    Drug use: Yes     Types: Marijuana       cephALEXin (KEFLEX) 500 MG capsule  albuterol (PROAIR HFA, PROVENTIL HFA, VENTOLIN HFA) 108 (90 BASE) MCG/ACT inhaler  azithromycin (ZITHROMAX) 250 MG tablet  loratadine (CLARITIN REDITABS) 10 MG dissolvable tablet  ondansetron (ZOFRAN ODT) 4 MG ODT tab  polyethylene glycol (MIRALAX/GLYCOLAX) powder  Spacer/Aero-Holding Chambers (AEROCHAMBER WITH MOUTHPIECE) MISC  sucralfate (CARAFATE) 1 GM tablet          Physical Exam     First Vitals:  Patient Vitals for the past 24 hrs:   BP Temp Temp src Pulse Resp SpO2 Height Weight   02/06/24 1921 (!) 145/92 97.5  F (36.4  C) Oral 87 16 100 % 1.626 m (5' 4\") 54.9 kg (121 lb)         PHYSICAL EXAM:   Physical Exam  Vitals and nursing note reviewed.   Constitutional:       General: She is not in acute distress.     Appearance: She is not ill-appearing, toxic-appearing or diaphoretic.   Cardiovascular:      Rate and Rhythm: Normal rate and regular rhythm.      Heart sounds: Normal heart sounds.   Pulmonary:      " Effort: Pulmonary effort is normal.      Breath sounds: Normal breath sounds.   Abdominal:      General: Abdomen is flat. Bowel sounds are normal. There is no distension.      Palpations: Abdomen is soft. There is no mass.      Tenderness: There is abdominal tenderness (mild suprapubic pressure). There is no right CVA tenderness, left CVA tenderness, guarding or rebound.   Neurological:      Mental Status: She is alert.             Results     LAB:  All pertinent labs reviewed and interpreted  Labs Ordered and Resulted from Time of ED Arrival to Time of ED Departure   ROUTINE UA WITH MICROSCOPIC REFLEX TO CULTURE - Abnormal       Result Value    Color Urine Colorless      Appearance Urine Turbid (*)     Glucose Urine Negative      Bilirubin Urine Negative      Ketones Urine Negative      Specific Gravity Urine 1.004      Blood Urine >1.0 mg/dL (*)     pH Urine 6.0      Protein Albumin Urine 30 (*)     Urobilinogen Urine <2.0      Nitrite Urine Negative      Leukocyte Esterase Urine 500 Lisandro/uL (*)     Bacteria Urine Few (*)     RBC Urine <1      WBC Urine 57 (*)    BASIC METABOLIC PANEL - Abnormal    Sodium 138      Potassium 3.6      Chloride 100      Carbon Dioxide (CO2) 22      Anion Gap 16 (*)     Urea Nitrogen 9.5      Creatinine 0.70      GFR Estimate >90      Calcium 9.0      Glucose 109 (*)    CBC WITH PLATELETS AND DIFFERENTIAL - Abnormal    WBC Count 14.8 (*)     RBC Count 4.48      Hemoglobin 13.8      Hematocrit 39.6      MCV 88      MCH 30.8      MCHC 34.8      RDW 11.9      Platelet Count 368      % Neutrophils 80      % Lymphocytes 13      % Monocytes 5      % Eosinophils 1      % Basophils 0      % Immature Granulocytes 1      NRBCs per 100 WBC 0      Absolute Neutrophils 11.8 (*)     Absolute Lymphocytes 1.9      Absolute Monocytes 0.8      Absolute Eosinophils 0.2      Absolute Basophils 0.1      Absolute Immature Granulocytes 0.1      Absolute NRBCs 0.0     HCG QUALITATIVE URINE - Normal    hCG  Urine Qualitative Negative     URINE CULTURE       RADIOLOGY:  No orders to display         I, Bita Jimenez, am serving as a scribe to document services personally performed by Ileana Amin PA-C, based on my observation and the provider's statements to me. I, Ileana Amin PA-C attest that Bita Jimenez is acting in a scribe capacity, has observed my performance of the services and has documented them in accordance with my direction.       Ileana Amin PA-C   Emergency Medicine   [facility] EMERGENCY DEPARTMENT       Ileana Amin PA-C  02/06/24 [id number]

## 2024-02-07 NOTE — ED TRIAGE NOTES
Patient developed pain with urination and urinary frequency two days ago, today she developed some blood when urinating. Pain increased at this time.     Triage Assessment (Adult)       Row Name 02/06/24 1923          Triage Assessment    Airway WDL WDL        Respiratory WDL    Respiratory WDL WDL        Skin Circulation/Temperature WDL    Skin Circulation/Temperature WDL WDL        Cardiac WDL    Cardiac WDL WDL        Peripheral/Neurovascular WDL    Peripheral Neurovascular WDL WDL        Cognitive/Neuro/Behavioral WDL    Cognitive/Neuro/Behavioral WDL WDL

## 2024-02-09 ENCOUNTER — TELEPHONE (OUTPATIENT)
Dept: EMERGENCY MEDICINE | Facility: HOSPITAL | Age: 24
End: 2024-02-09
Payer: COMMERCIAL

## 2024-02-09 NOTE — TELEPHONE ENCOUNTER
"[facility]    Reason for call: Lab Result Notification     Lab Result (including Rx patient on, if applicable).  If culture, copy of lab report at bottom.  Lab Result: Final Urine Culture Report on 2/7/24  [facility] Emergency Dept discharge antibiotic prescribed: Cephalexin (Keflex) 500 mg capsule, 1 capsule (500 mg) by mouth 4 times daily for 5 days.  #1. Bacteria, 50,000 - 100,000 CFU/ML Escherichia coli is SUSCEPTIBLE to Antibiotic.    No change in treatment per [facility] ED lab result Urine Culture prot    Creatinine Level (mg/dl)   Creatinine   Date Value Ref Range Status   02/06/2024 0.70 0.51 - 0.95 mg/dL Final   03/27/2021 0.73 0.52 - 1.04 mg/dL Final    Creatinine clearance (ml/min), if applicable    Serum creatinine: 0.7 mg/dL 02/06/24 1941  Estimated creatinine clearance: 108.3 mL/min     Patient's current Symptoms: return call from patient overall 'a little bit better'  Genitourinary symptoms: No hematuria, pain/burning continues but improved \"a little bit\", urinary frequency/urgency continues but improved  Fever: None    RN Recommendations/Instructions per [place] ED lab result protocol:   [facility] ED lab result protocol utilized: Urine culture  Patient notified, reports improving, advised to continue and complete antibiotic, probiotic, UTI prevention, return for any worsening fevers, flank pain, nausea/vomiting. Patient verbalized understanding and agrees with plan.  Patient Education on preventing future UTI's.  Practice good personal hygiene. Wipe yourself from front to back after using the toilet. This helps keep bacteria from getting into the urethra. Keep the genital area clean and dry.  Drink plenty of fluids, such as water, juice, or other caffeine-free drinks.  Drink at least 6-8 glasses a day (1 1/2 to 2 1/2 liters), unless you must restrict fluids for other medical reasons. This will force the medicine (antibiotic) into your urinary system and flush the " bacteria out of your body. Cranberry juice has been shown to help clear out the bacteria.  Empty your bladder. Always empty your bladder when you feel the urge to urinate. And always urinate before going to sleep. Urine that stays in your bladder can lead to infection. Try to urinate before and after sex as well.  Wear cotton underwear and cotton-lined panty hose; avoid tight-fitting pants.  Use condoms during sex. These help prevent UTIs caused by sexually transmitted bacteria. Also, avoid using spermicides during sex. These can increase the risk of UTIs. Choose other forms of birth control instead. For women who tend to get UTIs after sex, a low-dose of a preventive antibiotic may be used. Be sure to discuss this option with your health care provider.    Patient/care giver notified to contact your PCP clinic or return to the Emergency department if your:  Symptoms return.  Symptoms do not improve after 3 days on antibiotic.  Symptoms do not resolve after completing antibiotic.  Symptoms worsen or other concerning symptoms.    Mirza Ellis RN

## 2024-02-09 NOTE — TELEPHONE ENCOUNTER
[facility]    Reason for call: Lab Result Notification     Lab Result (including Rx patient on, if applicable).  If culture, copy of lab report at bottom.  Lab Result: Final Urine Culture Report on 2/7/24  [facility] Emergency Dept discharge antibiotic prescribed: Cephalexin (Keflex) 500 mg capsule, 1 capsule (500 mg) by mouth 4 times daily for 5 days.  #1. Bacteria, 50,000 - 100,000 CFU/ML Escherichia coli is SUSCEPTIBLE to Antibiotic.    No change in treatment per [facility] ED lab result Urine Culture protocol.    Creatinine Level (mg/dl)   Creatinine   Date Value Ref Range Status   02/06/2024 0.70 0.51 - 0.95 mg/dL Final   03/27/2021 0.73 0.52 - 1.04 mg/dL Final    Creatinine clearance (ml/min), if applicable    Serum creatinine: 0.7 mg/dL 02/06/24 1941  Estimated creatinine clearance: 108.3 mL/min     Patient's current Symptoms:   Left voicemail message requesting a call back to [facility] ED Lab Result RN at [phone]. RN is available every day between 9 a.m. and 5:30 p.m.      RN Recommendations/Instructions per [place] ED lab result protocol:   [facility] ED lab result protocol utilized: Urine culture      Mirza Ellis RN

## 2024-06-29 ENCOUNTER — HEALTH MAINTENANCE LETTER (OUTPATIENT)
Age: 24
End: 2024-06-29

## 2025-07-13 ENCOUNTER — HEALTH MAINTENANCE LETTER (OUTPATIENT)
Age: 25
End: 2025-07-13